# Patient Record
Sex: FEMALE | Race: WHITE | Employment: OTHER | URBAN - METROPOLITAN AREA
[De-identification: names, ages, dates, MRNs, and addresses within clinical notes are randomized per-mention and may not be internally consistent; named-entity substitution may affect disease eponyms.]

---

## 2017-10-13 ENCOUNTER — TELEPHONE (OUTPATIENT)
Dept: FAMILY MEDICINE CLINIC | Age: 82
End: 2017-10-13

## 2017-10-13 NOTE — TELEPHONE ENCOUNTER
THIS HAS TO GO THROUGH HER INSURANCE AND SHE IS  [place] I DO NOT HAVE ANY INFORMATION ON PT IS THIS SOMETHING THE NURSING HOME DOSE? Dunia Agarwal

## 2017-11-16 ENCOUNTER — TELEPHONE (OUTPATIENT)
Dept: FAMILY MEDICINE CLINIC | Age: 82
End: 2017-11-16

## 2018-08-07 ENCOUNTER — OFFICE VISIT (OUTPATIENT)
Dept: ENT CLINIC | Age: 83
End: 2018-08-07

## 2018-08-07 VITALS — HEART RATE: 55 BPM | DIASTOLIC BLOOD PRESSURE: 62 MMHG | SYSTOLIC BLOOD PRESSURE: 102 MMHG

## 2018-08-07 DIAGNOSIS — H90.0 CONDUCTIVE HEARING LOSS OF BOTH EARS: ICD-10-CM

## 2018-08-07 DIAGNOSIS — H61.23 BILATERAL IMPACTED CERUMEN: Primary | ICD-10-CM

## 2018-08-07 PROCEDURE — 69210 REMOVE IMPACTED EAR WAX UNI: CPT | Performed by: OTOLARYNGOLOGY

## 2018-08-07 PROCEDURE — 99999 PR OFFICE/OUTPT VISIT,PROCEDURE ONLY: CPT | Performed by: OTOLARYNGOLOGY

## 2018-08-07 RX ORDER — LISINOPRIL 10 MG/1
10 TABLET ORAL
COMMUNITY
Start: 2017-05-11

## 2018-08-07 RX ORDER — BUPROPION HYDROCHLORIDE 150 MG/1
150 TABLET ORAL EVERY MORNING
COMMUNITY

## 2018-08-07 RX ORDER — TRIAMTERENE AND HYDROCHLOROTHIAZIDE 37.5; 25 MG/1; MG/1
TABLET ORAL
COMMUNITY
Start: 2017-05-22

## 2018-08-22 ENCOUNTER — TELEPHONE (OUTPATIENT)
Dept: PHARMACY | Facility: CLINIC | Age: 83
End: 2018-08-22

## 2018-08-22 NOTE — TELEPHONE ENCOUNTER
CLINICAL PHARMACY: ADHERENCE REVIEW    Per [address]:  Atorvastatin and lisinopril last filled on 7/28/18 for a 28-day supply billed thru patient's [employer]. Patient is in a long term care facility and 28 days at a time are shipped out. No patient out reach planned at this time. -------------------------------------------------------------------------  For Pharmacy Admin Tracking Only  PHSO: Yes  Total # of Interventions Recommended: 1  - New Order #: 1 New Medication Order Reason(s):  Adherence  - Refills Provided #: 0  - Maintenance Safety Lab Monitoring #: 1  Total Interventions Accepted: 1  Time Spent (min): 5    Sanjuanita Keane, PharmD  [address]

## 2018-10-31 ENCOUNTER — TELEPHONE (OUTPATIENT)
Dept: FAMILY MEDICINE CLINIC | Age: 83
End: 2018-10-31

## 2020-01-01 ENCOUNTER — OFFICE VISIT (OUTPATIENT)
Dept: ORTHOPEDIC SURGERY | Age: 85
End: 2020-01-01
Payer: MEDICARE

## 2020-01-01 VITALS — TEMPERATURE: 97.9 F | BODY MASS INDEX: 26.5 KG/M2 | HEIGHT: 60 IN | WEIGHT: 135 LBS

## 2020-01-01 VITALS — TEMPERATURE: 98.6 F | BODY MASS INDEX: 26.5 KG/M2 | WEIGHT: 135 LBS | HEIGHT: 60 IN

## 2020-01-01 VITALS — TEMPERATURE: 97.1 F | BODY MASS INDEX: 26.37 KG/M2 | WEIGHT: 135 LBS

## 2020-01-01 PROCEDURE — 20610 DRAIN/INJ JOINT/BURSA W/O US: CPT | Performed by: ORTHOPAEDIC SURGERY

## 2020-01-01 PROCEDURE — G8417 CALC BMI ABV UP PARAM F/U: HCPCS | Performed by: ORTHOPAEDIC SURGERY

## 2020-01-01 PROCEDURE — 1123F ACP DISCUSS/DSCN MKR DOCD: CPT | Performed by: ORTHOPAEDIC SURGERY

## 2020-01-01 PROCEDURE — G8427 DOCREV CUR MEDS BY ELIG CLIN: HCPCS | Performed by: ORTHOPAEDIC SURGERY

## 2020-01-01 PROCEDURE — G8484 FLU IMMUNIZE NO ADMIN: HCPCS | Performed by: ORTHOPAEDIC SURGERY

## 2020-01-01 PROCEDURE — 4040F PNEUMOC VAC/ADMIN/RCVD: CPT | Performed by: ORTHOPAEDIC SURGERY

## 2020-01-01 PROCEDURE — 1090F PRES/ABSN URINE INCON ASSESS: CPT | Performed by: ORTHOPAEDIC SURGERY

## 2020-01-01 PROCEDURE — 99212 OFFICE O/P EST SF 10 MIN: CPT | Performed by: ORTHOPAEDIC SURGERY

## 2020-01-01 PROCEDURE — 1036F TOBACCO NON-USER: CPT | Performed by: ORTHOPAEDIC SURGERY

## 2020-07-26 ENCOUNTER — TELEPHONE (OUTPATIENT)
Dept: FAMILY MEDICINE CLINIC | Age: 85
End: 2020-07-26

## 2020-08-04 ENCOUNTER — HOSPITAL ENCOUNTER (EMERGENCY)
Age: 85
Discharge: HOME | End: 2020-08-04
Payer: MEDICARE

## 2020-08-04 VITALS
OXYGEN SATURATION: 98 % | HEART RATE: 73 BPM | RESPIRATION RATE: 18 BRPM | TEMPERATURE: 98 F | SYSTOLIC BLOOD PRESSURE: 151 MMHG | WEIGHT: 135 LBS | BODY MASS INDEX: 26.5 KG/M2 | HEIGHT: 60 IN | DIASTOLIC BLOOD PRESSURE: 85 MMHG

## 2020-08-04 PROCEDURE — 99283 EMERGENCY DEPT VISIT LOW MDM: CPT

## 2020-08-04 ASSESSMENT — ENCOUNTER SYMPTOMS
NAUSEA: 0
CHEST TIGHTNESS: 0
COUGH: 0
SHORTNESS OF BREATH: 0
VOMITING: 0
BACK PAIN: 0
ABDOMINAL PAIN: 0
DIARRHEA: 0
CONSTIPATION: 0
RESPIRATORY NEGATIVE: 1
PHOTOPHOBIA: 0
COLOR CHANGE: 0

## 2020-08-04 NOTE — ED NOTES
Report called to [facility], spoke with Maureen Reed RN, v/u. Denies further questions at this time.          Eddie Vora RN  08/04/20 [id number]

## 2020-08-04 NOTE — ED PROVIDER NOTES
[facility]        Pt Name: Aaron Olivas  MRN: [id number]  [place] 9/7/1930  Date of evaluation: 8/4/2020  Provider: COREY Cervantes  PCP: Vance Peralta MD    Evaluation by SORAYA. My supervising physician was available for consultation. CHIEF COMPLAINT       Chief Complaint   Patient presents with    Fall     pt brought in by EMS from assisted living. Pt c/o mechanical fall. Denies hitting head, denies LOC, uses 324 mg of ASA. Pt denies any pain at all. Pt sts \"i landed on my big fat ass and i know i didnt break that so i didnt want to come. \"        HISTORY OF PRESENT ILLNESS   (Location, Timing/Onset, Context/Setting, Quality, Duration, Modifying Factors, Severity, Associated Signs and Symptoms)  Note limiting factors. Aaron Olivas is a 80 y.o. female with past medical history of hypertension, hyperlipidemia, vertigo and diverticulosis who presents to the ED with complaint of a fall. Patient had a mechanical fall this morning. Patient lives in assisted living. Patient that she has chronic balance problems and states she lost her balance and fell onto her butt. Patient that she not hit her head. Denies loss of conscious. Does take full dose aspirin daily. Denies any other blood thinners. Patient states she feels fine did not want to come but her  push the \"panic button\" and EMS showed up and took her to the emergency department. Patient states she feels fine at this time. Denies any complaints. Denies any pain to her tailbone or buttock. Denies any hip pain, chest pain, shortness of breath, headache, lightheadedness/dizziness, neck pain, visual changes, speech disturbances, numbness/tingling, shortness of breath, abdominal pain, nausea/vomiting, urinary symptoms or changes in bowel movements. Patient states she does not want to be here at this time and wants to go back to her facility.   Denies any injury at this time. Nursing Notes were all reviewed and agreed with or any disagreements were addressed in the HPI. REVIEW OF SYSTEMS    (2-9 systems for level 4, 10 or more for level 5)     Review of Systems   Constitutional: Negative for activity change, appetite change, chills, diaphoresis, fatigue and fever. Eyes: Negative for photophobia and visual disturbance. Respiratory: Negative. Negative for cough, chest tightness and shortness of breath. Cardiovascular: Negative. Negative for chest pain, palpitations and leg swelling. Gastrointestinal: Negative for abdominal pain, constipation, diarrhea, nausea and vomiting. Genitourinary: Negative for decreased urine volume, difficulty urinating, dysuria, flank pain, frequency, hematuria and urgency. Musculoskeletal: Negative for arthralgias, back pain, gait problem, joint swelling, myalgias, neck pain and neck stiffness. Skin: Negative for color change, pallor, rash and wound. Neurological: Negative for dizziness, tremors, seizures, syncope, facial asymmetry, speech difficulty, weakness, light-headedness, numbness and headaches. Positives and Pertinent negatives as per HPI. Except as noted above in the ROS, all other systems were reviewed and negative. PAST MEDICAL HISTORY     Past Medical History:   Diagnosis Date    Diverticulosis of colon 03/29/2005    On colonoscopy    Hypercholesterolemia     Hypertension     Vertigo          SURGICAL HISTORY     Past Surgical History:   Procedure Laterality Date    COLONOSCOPY N/A 03/29/2005    Diverticulosis. No polyps         CURRENTMEDICATIONS       Previous Medications    AMLODIPINE (NORVASC) 5 MG TABLET    Take 2.5 mg by mouth daily. BUPROPION (WELLBUTRIN XL) 150 MG EXTENDED RELEASE TABLET    Take 150 mg by mouth every morning    LISINOPRIL (PRINIVIL;ZESTRIL) 10 MG TABLET    Take 10 mg by mouth    SIMVASTATIN (ZOCOR) 20 MG TABLET    Take 20 mg by mouth nightly. Chest:      Chest wall: No tenderness. Abdominal:      General: Abdomen is flat. Bowel sounds are normal. There is no distension. Palpations: Abdomen is soft. There is no mass. Tenderness: There is no abdominal tenderness. There is no right CVA tenderness, left CVA tenderness, guarding or rebound. Hernia: No hernia is present. Musculoskeletal: Normal range of motion. Comments: No tenderness to palpation of the midline cervical, thoracic or lumbar spine. No tenderness over the sacrum or coccyx. No pelvis instability. No anterior chest wall tenderness. No TTP to the upper and lower extremities throughout. Full range of motion strength the upper and lower extremity throughout. Distal neurovascular intact. Gait deferred at this time. Lymphadenopathy:      Cervical: No cervical adenopathy. Skin:     General: Skin is warm and dry. Coloration: Skin is not pale. Findings: No erythema or rash. Neurological:      General: No focal deficit present. Mental Status: She is alert and oriented to person, place, and time. GCS: GCS eye subscore is 4. GCS verbal subscore is 5. GCS motor subscore is 6. Cranial Nerves: Cranial nerves are intact. No cranial nerve deficit. Sensory: No sensory deficit. Motor: Motor function is intact. Comments: Gait deferred at this time. Psychiatric:         Behavior: Behavior normal.         DIAGNOSTIC RESULTS   LABS:    Labs Reviewed - No data to display    All other labs were within normal range or not returned as of this dictation. EKG: All EKG's are interpreted by the Emergency Department Physician in the absence of a cardiologist.  Please see their note for interpretation of EKG.       RADIOLOGY:   Non-plain film images such as CT, Ultrasound and MRI are read by the radiologist. Plain radiographic images are visualized and preliminarily interpreted by the ED Provider with the below findings:        Interpretation per the Radiologist below, if available at the time of this note:    No orders to display     No results found. PROCEDURES   Unless otherwise noted below, none     Procedures    CRITICAL CARE TIME   N/A    CONSULTS:  None      EMERGENCY DEPARTMENT COURSE and DIFFERENTIAL DIAGNOSIS/MDM:   Vitals:    Vitals:    08/04/20 1339 08/04/20 1351   BP:  (!) 151/85   Pulse: 73    Resp: 18    Temp: 98 °F (36.7 °C)    TempSrc: Oral    SpO2: 98%    Weight: 135 lb (61.2 kg)    Height: 5' (1.524 m)        Patient was given the following medications:  Medications - No data to display        Patient is an 77-year-old female who presents to the ED plan of a fall. Patient that she has chronic balance problems. Patient states she had a fall and she landed on her tailbone. Denies any pain to the tailbone. Patient denies any other injury or trauma throughout. Denies any head injury or loss of conscious. Denies any neck pain. No injury noted on exam.  Full range of motion strength throughout. Distal neurovascular intact. Able to ambulate here in the ED without difficulty. Alert and oriented x3 with reassuring neuro examination and no injury noted on exam.  Given patient's history and physical examination believe can be safely discharged home for outpatient management. Return ED for any worsening symptoms. Low suspicion for intracranial injury, cervical injury, acute fracture/dislocation, intra-abdominal abnormality, intrathoracic abnormality, infection or other emergent condition at this time. FINAL IMPRESSION      1. Fall from slip, trip, or stumble, initial encounter          DISPOSITION/PLAN   DISPOSITION Decision To Discharge 08/04/2020 02:26:17 PM      PATIENT REFERREDTO:  Yanna Yun [address]  [phone]    Schedule an appointment as soon as possible for a visit   For a Re-check in  3-5    days.     [facility]  [place]

## 2020-08-04 NOTE — ED NOTES
Pt d/c instructions given, v/u.Denies needs at this time. A&O with no signs of distress. Pt wheeled to exit.      Gamaliel Werner RN  08/04/20 9623

## 2020-08-17 ENCOUNTER — OFFICE VISIT (OUTPATIENT)
Dept: ORTHOPEDIC SURGERY | Age: 85
End: 2020-08-17
Payer: MEDICARE

## 2020-08-17 VITALS — HEIGHT: 60 IN | TEMPERATURE: 97.9 F | BODY MASS INDEX: 26.5 KG/M2 | WEIGHT: 135 LBS

## 2020-08-17 PROCEDURE — 20610 DRAIN/INJ JOINT/BURSA W/O US: CPT | Performed by: ORTHOPAEDIC SURGERY

## 2020-08-17 PROCEDURE — 4040F PNEUMOC VAC/ADMIN/RCVD: CPT | Performed by: ORTHOPAEDIC SURGERY

## 2020-08-17 PROCEDURE — 1123F ACP DISCUSS/DSCN MKR DOCD: CPT | Performed by: ORTHOPAEDIC SURGERY

## 2020-08-17 PROCEDURE — 99203 OFFICE O/P NEW LOW 30 MIN: CPT | Performed by: ORTHOPAEDIC SURGERY

## 2020-08-17 PROCEDURE — 1036F TOBACCO NON-USER: CPT | Performed by: ORTHOPAEDIC SURGERY

## 2020-08-17 PROCEDURE — G8427 DOCREV CUR MEDS BY ELIG CLIN: HCPCS | Performed by: ORTHOPAEDIC SURGERY

## 2020-08-17 PROCEDURE — 1090F PRES/ABSN URINE INCON ASSESS: CPT | Performed by: ORTHOPAEDIC SURGERY

## 2020-08-17 PROCEDURE — G8419 CALC BMI OUT NRM PARAM NOF/U: HCPCS | Performed by: ORTHOPAEDIC SURGERY

## 2020-08-17 RX ORDER — METHYLPREDNISOLONE ACETATE 40 MG/ML
40 INJECTION, SUSPENSION INTRA-ARTICULAR; INTRALESIONAL; INTRAMUSCULAR; SOFT TISSUE ONCE
Status: COMPLETED | OUTPATIENT
Start: 2020-08-17 | End: 2020-08-17

## 2020-08-17 RX ORDER — ROPIVACAINE HYDROCHLORIDE 5 MG/ML
30 INJECTION, SOLUTION EPIDURAL; INFILTRATION; PERINEURAL ONCE
Status: COMPLETED | OUTPATIENT
Start: 2020-08-17 | End: 2020-08-17

## 2020-08-17 RX ADMIN — ROPIVACAINE HYDROCHLORIDE 30 ML: 5 INJECTION, SOLUTION EPIDURAL; INFILTRATION; PERINEURAL at 11:36

## 2020-08-17 RX ADMIN — METHYLPREDNISOLONE ACETATE 40 MG: 40 INJECTION, SUSPENSION INTRA-ARTICULAR; INTRALESIONAL; INTRAMUSCULAR; SOFT TISSUE at 11:36

## 2020-08-17 NOTE — PROGRESS NOTES
[facility]  Office Visit  New Patient  Date:  2020    Name:  Larisa Conde  Address:  [address] [place], [place] [address]    :  1930      Age:   80 y.o.    SSN:  [id number]      Medical Record Number:  [id number]    Chief Complaint:    Right knee pain    HPI:   Larisa Conde is a 80 y.o. female who presents for evaluation of right knee pain. Patient states she had an injury when she was much younger falling on ice onto the right knee. Since that time she is had on and off again right knee pain. She states recently is gotten much worse. She states most of her pain is in the front of the knee. She is taking over-the-counter ibuprofen for pain relief. She denies ever having previous injections into the knee. She states she is in physical therapy currently. She has not been ambulating for the last 4 weeks and prior to this was using a walker for ambulation. She denies any new numbness, tingling, fevers, chills, chest pain, shortness of breath, or any other new significant symptoms. Pain Assessment  Location of Pain: Knee  Location Modifiers: Left, Right  Severity of Pain: 8  Quality of Pain: Aching  Duration of Pain: Persistent  Frequency of Pain: Constant  Date Pain First Started: (ongoing for years)  Aggravating Factors: Bending, Stretching, Straightening, Walking, Stairs  Limiting Behavior: Yes  Relieving Factors: Rest, Ice, Exercise  Result of Injury: No  Work-Related Injury: No  Are there other pain locations you wish to document?: No    Past History:  Past Medical History:   Diagnosis Date    Diverticulosis of colon 2005    On colonoscopy    Hypercholesterolemia     Hypertension     Vertigo        Past Surgical History:   Procedure Laterality Date    COLONOSCOPY N/A 2005    Diverticulosis.   No polyps       Social History     Tobacco Use    Smoking status: Never Smoker    Smokeless tobacco: Never Used   Substance Use Topics    Alcohol use: No    Drug use: Not on file        Family History:  family history is not on file. Current Outpatient Medications:     lisinopril (PRINIVIL;ZESTRIL) 10 MG tablet, Take 10 mg by mouth, Disp: , Rfl:     triamterene-hydrochlorothiazide (MAXZIDE-25) 37.5-25 MG per tablet, TAKE ONE TABLET BY MOUTH DAILY, Disp: , Rfl:     buPROPion (WELLBUTRIN XL) 150 MG extended release tablet, Take 150 mg by mouth every morning, Disp: , Rfl:     amlodipine (NORVASC) 5 MG tablet, Take 2.5 mg by mouth daily. , Disp: , Rfl:     simvastatin (ZOCOR) 20 MG tablet, Take 20 mg by mouth nightly.  , Disp: , Rfl:       Allergies   Allergen Reactions    Pneumococcal Vaccine      Pneumococcal 23-eric Ps Vaccine  Redness, swelling of arm- 10/2014      Escitalopram Nausea Only     Loss of appetite 10/14    Amoxicillin Rash    Tramadol Nausea And Vomiting         Review of Systems: A 14 point review of systems available in the scanned medical record as documented by the patient on 8/17/2020. Today's review pertinent items are noted in HPI. No notes on file    Physical Exam:  Temp 97.9 °F (36.6 °C)   Ht 5' (1.524 m)   Wt 135 lb (61.2 kg)   BMI 26.37 kg/m²       General: No acute distress, well nourished  CV: No obvious peripheral edema.  Normal peripheral pulses  Neuro: Alert & oriented x 3  Psych: Normal mood and affect    Right knee exam    Minimal effusion present  No redness or warmth  Patient is roughly 5 degrees short of full extension, can flex to approximately 115 degrees  Knee stable with varus valgus testing  Grossly patient does have a valgus alignment this is passively correctable to near neutral  Patient has tenderness to palpation anteriorly along the medial lateral joint lines as well as the edge of the patella      Radiographic:  X-rays obtained and reviewed in office, reviewed and interpreted by me today:  Views: 3 views  Location: Right knee  Impression: Patient has tricompartmental osteoarthritis most prominently in the lateral compartment    Assessment:  Harper Maurer is a 80 y.o. female with:  1. Right knee osteoarthritis    Impression:  Encounter Diagnosis   Name Primary?  Left knee pain, unspecified chronicity Yes       Office Procedures:  No orders of the defined types were placed in this encounter. Plan:   Pertinent imaging was reviewed. The etiology, natural history, and treatment options for the disorder were discussed. The roles of activity modifications, medications, injections, physical therapy, and surgical interventions were all described to the patient and questions were answered. Discussed with the patient that she does have osteoarthritis of the right knee. She has not tried corticosteroid injections and we did recommend this today. Patient was agreeable to this, see procedure note. We encouraged the patient to continue to work with physical therapy and to stay active. We would like to see her back in approximately 1 month for repeat clinical exam.    Procedure note-right knee intra-articular steroid injection    The right knee was sterilely prepped with alcohol. Next solution containing 80 mg of Depo-Medrol as well as 15 mg of ropivacaine was injected into the right knee for osteoarthritis. Band-Aid was applied patient tolerated the procedure well. [address]    The encounter with Harper Maurer was supervised by Dr. Young Peralta who personally examined the patient and reviewed the plan. This dictation was performed with a verbal recognition program (DRAGON) and it was checked for errors. It is possible that there are still dictated errors within this office note. If so, please bring any errors to my attention for an addendum. All efforts were made to ensure that this office note is accurate.  Attestation:  I was physically present and performed my own examination of this patient and have discussed the case, including pertinent history and exam findings with the fellow. I agree with the documented assessment and plan. King Cem.  Leeann Cuevas MD

## 2020-08-26 ENCOUNTER — TELEPHONE (OUTPATIENT)
Dept: ORTHOPEDIC SURGERY | Age: 85
End: 2020-08-26

## 2020-08-26 NOTE — TELEPHONE ENCOUNTER
Called and spoke with nurse [place] -- advised icing and they are giving her OTC ibuprofen --  If not better in a few weeks we can see about getting her scheduled for supartz inj  -- order placed to start precert on supartz we see once approved or denied

## 2020-08-26 NOTE — TELEPHONE ENCOUNTER
Patient's nursing home called. Patient had Cortizone shot in knee at last appointment. She is still in pain and wanting to know if there is anything else she can do.  Please contact patient's nurse, Bryanna Muhammad, at [phone]

## 2020-08-31 ENCOUNTER — TELEPHONE (OUTPATIENT)
Dept: ORTHOPEDIC SURGERY | Age: 85
End: 2020-08-31

## 2020-09-08 ENCOUNTER — TELEPHONE (OUTPATIENT)
Dept: ORTHOPEDIC SURGERY | Age: 85
End: 2020-09-08

## 2020-09-08 NOTE — TELEPHONE ENCOUNTER
09/08/2020 GELSYN-3 (SERIES OF 3)  RIGHT  KNEE. NO AUTHORIZATION REQUIRED. VALID & BILLABLE. CAN BUY& BILL. PER NOTES FOR THIS [facility] MEDICARE [facility]. NOTE: Original request was for non-preferred drug SUPARTZ. Okay to switch to preferred drug GELSYN-E, Per Joel .  VLADIMIR    AP

## 2020-09-10 ENCOUNTER — TELEPHONE (OUTPATIENT)
Dept: ORTHOPEDIC SURGERY | Age: 85
End: 2020-09-10

## 2020-09-10 NOTE — TELEPHONE ENCOUNTER
Patient's  is calling was trying to schedule wife for injection. Patient is already scheduled. He would like a call back.  Ph [phone]

## 2020-09-21 ENCOUNTER — OFFICE VISIT (OUTPATIENT)
Dept: ORTHOPEDIC SURGERY | Age: 85
End: 2020-09-21
Payer: MEDICARE

## 2020-09-21 VITALS — HEIGHT: 60 IN | BODY MASS INDEX: 26.5 KG/M2 | WEIGHT: 135 LBS | TEMPERATURE: 98.6 F

## 2020-09-21 PROCEDURE — 4040F PNEUMOC VAC/ADMIN/RCVD: CPT | Performed by: ORTHOPAEDIC SURGERY

## 2020-09-21 PROCEDURE — 1090F PRES/ABSN URINE INCON ASSESS: CPT | Performed by: ORTHOPAEDIC SURGERY

## 2020-09-21 PROCEDURE — 1123F ACP DISCUSS/DSCN MKR DOCD: CPT | Performed by: ORTHOPAEDIC SURGERY

## 2020-09-21 PROCEDURE — G8427 DOCREV CUR MEDS BY ELIG CLIN: HCPCS | Performed by: ORTHOPAEDIC SURGERY

## 2020-09-21 PROCEDURE — G8417 CALC BMI ABV UP PARAM F/U: HCPCS | Performed by: ORTHOPAEDIC SURGERY

## 2020-09-21 PROCEDURE — 20610 DRAIN/INJ JOINT/BURSA W/O US: CPT | Performed by: ORTHOPAEDIC SURGERY

## 2020-09-21 PROCEDURE — 1036F TOBACCO NON-USER: CPT | Performed by: ORTHOPAEDIC SURGERY

## 2020-09-21 PROCEDURE — 99213 OFFICE O/P EST LOW 20 MIN: CPT | Performed by: ORTHOPAEDIC SURGERY

## 2020-09-21 NOTE — PROGRESS NOTES
37.5-25 MG per tablet, TAKE ONE TABLET BY MOUTH DAILY, Disp: , Rfl:   buPROPion (WELLBUTRIN XL) 150 MG extended release tablet, Take 150 mg by mouth every morning, Disp: , Rfl:   amlodipine (NORVASC) 5 MG tablet, Take 2.5 mg by mouth daily. , Disp: , Rfl:   simvastatin (ZOCOR) 20 MG tablet, Take 20 mg by mouth nightly.  , Disp: , Rfl:     No current facility-administered medications for this visit. -- Pneumococcal Vaccine     --  Pneumococcal 23-eric Ps Vaccine             Redness, swelling of arm- 10/2014   -- Escitalopram -- Nausea Only    --  Loss of appetite 10/14   -- Amoxicillin -- Rash   -- Tramadol -- Nausea And Vomiting    VITAL SIGNS:  Temp 98.6 °F (37 °C)   Ht 5' (1.524 m)   Wt 135 lb (61.2 kg)   BMI 26.37 kg/m²   On examination today she lacks about 15 degrees of extension and flexes to about 90 degrees. She has no warmth, erythema, or effusion. After sterile prep we injected the right knee with 2 mL's of gelsyn 3 for osteoarthritis. The patient tolerated this procedure well.

## 2020-09-28 NOTE — PROGRESS NOTES
The patient returns today for right knee arthritis. She is scheduled for second Visco supplementation. She had no trouble after the first shot. ROS: Pertinent items are noted in HPI. No notes on file    Past Medical History:  8/7/2018: Conductive hearing loss of both ears  No date: Dementia ([address]  No date: Depression  03/29/2005: Diverticulosis of colon      Comment:  On colonoscopy  No date: Essential tremor  No date: Hypercholesterolemia  No date: Hypertension  No date: Paroxysmal atrial fibrillation (HCC)  No date: Senile osteoporosis  No date: Vertigo     Past Surgical History:  03/29/2005: COLONOSCOPY; N/A      Comment:  Diverticulosis. No polyps    History reviewed. No pertinent family history.       Social History    Socioeconomic History      Marital status:       Spouse name: None      Number of children: None      Years of education: None      Highest education level: None    Occupational History      None    Social Needs      Financial resource strain: None      Food insecurity        Worry: None        Inability: None      Transportation needs        Medical: None        Non-medical: None    Tobacco Use      Smoking status: Never Smoker      Smokeless tobacco: Never Used    Substance and Sexual Activity      Alcohol use: No      Drug use: None      Sexual activity: None    Lifestyle      Physical activity        Days per week: None        Minutes per session: None      Stress: None    Relationships      Social connections        Talks on phone: None        Gets together: None        Attends Anglican service: None        Active member of club or organization: None        Attends meetings of clubs or organizations: None        Relationship status: None      Intimate partner violence        Fear of current or ex partner: None        Emotionally abused: None        Physically abused: None        Forced sexual activity: None    Other Topics      Concerns:        None    Social History Narrative      None      Current Outpatient Medications:  lisinopril (PRINIVIL;ZESTRIL) 10 MG tablet, Take 10 mg by mouth, Disp: , Rfl:   triamterene-hydrochlorothiazide (MAXZIDE-25) 37.5-25 MG per tablet, TAKE ONE TABLET BY MOUTH DAILY, Disp: , Rfl:   buPROPion (WELLBUTRIN XL) 150 MG extended release tablet, Take 150 mg by mouth every morning, Disp: , Rfl:   amlodipine (NORVASC) 5 MG tablet, Take 2.5 mg by mouth daily. , Disp: , Rfl:   simvastatin (ZOCOR) 20 MG tablet, Take 20 mg by mouth nightly.  , Disp: , Rfl:     No current facility-administered medications for this visit. -- Pneumococcal Vaccine     --  Pneumococcal 23-eric Ps Vaccine             Redness, swelling of arm- 10/2014   -- Escitalopram -- Nausea Only    --  Loss of appetite 10/14   -- Amoxicillin -- Rash   -- Tramadol -- Nausea And Vomiting    VITAL SIGNS:  Temp 97.9 °F (36.6 °C)   Ht 5' (1.524 m)   Wt 135 lb (61.2 kg)   BMI 26.37 kg/m²   Lamination right knee shows no warmth, erythema, or effusion. After sterile prep injected right knee with 2 mL's of Gelsyn 3 for osteoarthritis. The patient tolerated this procedure well.

## 2020-10-19 NOTE — PROGRESS NOTES
Patient returns with her  today. She is scheduled for third Jellison injection right knee. She states she thinks she feels a little better. ROS: Pertinent items are noted in HPI. No notes on file    Past Medical History:  8/7/2018: Conductive hearing loss of both ears  No date: Dementia ([address]  No date: Depression  03/29/2005: Diverticulosis of colon      Comment:  On colonoscopy  No date: Essential tremor  No date: Hypercholesterolemia  No date: Hypertension  No date: Paroxysmal atrial fibrillation (HCC)  No date: Senile osteoporosis  No date: Vertigo     Past Surgical History:  03/29/2005: COLONOSCOPY; N/A      Comment:  Diverticulosis. No polyps    History reviewed. No pertinent family history.       Social History    Socioeconomic History      Marital status:       Spouse name: None      Number of children: None      Years of education: None      Highest education level: None    Occupational History      None    Social Needs      Financial resource strain: None      Food insecurity        Worry: None        Inability: None      Transportation needs        Medical: None        Non-medical: None    Tobacco Use      Smoking status: Never Smoker      Smokeless tobacco: Never Used    Substance and Sexual Activity      Alcohol use: No      Drug use: None      Sexual activity: None    Lifestyle      Physical activity        Days per week: None        Minutes per session: None      Stress: None    Relationships      Social connections        Talks on phone: None        Gets together: None        Attends Baptism service: None        Active member of club or organization: None        Attends meetings of clubs or organizations: None        Relationship status: None      Intimate partner violence        Fear of current or ex partner: None        Emotionally abused: None        Physically abused: None        Forced sexual activity: None    Other Topics      Concerns:        None    Social History

## 2020-11-06 NOTE — TELEPHONE ENCOUNTER
Patient has a new rhythmic hand tremors. Is not able to button her close and hold things. A/P tremor  Tremor could be an interaction of the amantadine and dextromethorphan (Siltussin, DM). Or it could be a dopaminergic side effect of bupropion at 300 mg as patient was on the 50 mg not sure when prior dose was. Could be a B12 or folate deficiency. Ordered serum homocystine and urine MMA. Could be a thyroid problem get a TSH with reflex free T4. If all normal would refer patient to Dr. Tessa Werner neurology at [facility].
ALCON Segura

## 2020-11-16 NOTE — PROGRESS NOTES
Patient is here for follow-up of right knee osteoarthritis. She completed viscosupplementation over a month ago does not feel she got much relief. Prior to this she had a steroid injections which did not feel provided much relief. ROS: Pertinent items are noted in HPI. No notes on file    Past Medical History:  8/7/2018: Conductive hearing loss of both ears  No date: Dementia ([address]  No date: Depression  03/29/2005: Diverticulosis of colon      Comment:  On colonoscopy  No date: Essential tremor  No date: Hypercholesterolemia  No date: Hypertension  No date: Paroxysmal atrial fibrillation (HCC)  No date: Senile osteoporosis  No date: Vertigo     Past Surgical History:  03/29/2005: COLONOSCOPY; N/A      Comment:  Diverticulosis. No polyps    History reviewed. No pertinent family history.       Social History    Socioeconomic History      Marital status:       Spouse name: None      Number of children: None      Years of education: None      Highest education level: None    Occupational History      None    Social Needs      Financial resource strain: None      Food insecurity        Worry: None        Inability: None      Transportation needs        Medical: None        Non-medical: None    Tobacco Use      Smoking status: Never Smoker      Smokeless tobacco: Never Used    Substance and Sexual Activity      Alcohol use: No      Drug use: None      Sexual activity: None    Lifestyle      Physical activity        Days per week: None        Minutes per session: None      Stress: None    Relationships      Social connections        Talks on phone: None        Gets together: None        Attends [employer] service: None        Active member of club or organization: None        Attends meetings of clubs or organizations: None        Relationship status: None      Intimate partner violence        Fear of current or ex partner: None        Emotionally abused: None        Physically abused: None        Forced sexual activity: None    Other Topics      Concerns:        None    Social History Narrative      None      Current Outpatient Medications:  lisinopril (PRINIVIL;ZESTRIL) 10 MG tablet, Take 10 mg by mouth, Disp: , Rfl:   triamterene-hydrochlorothiazide (MAXZIDE-25) 37.5-25 MG per tablet, TAKE ONE TABLET BY MOUTH DAILY, Disp: , Rfl:   buPROPion (WELLBUTRIN XL) 150 MG extended release tablet, Take 150 mg by mouth every morning, Disp: , Rfl:   amlodipine (NORVASC) 5 MG tablet, Take 2.5 mg by mouth daily. , Disp: , Rfl:   simvastatin (ZOCOR) 20 MG tablet, Take 20 mg by mouth nightly.  , Disp: , Rfl:     No current facility-administered medications for this visit. -- Pneumococcal Vaccine     --  Pneumococcal 23-eric Ps Vaccine             Redness, swelling of arm- 10/2014   -- Escitalopram -- Nausea Only    --  Loss of appetite 10/14   -- Amoxicillin -- Rash   -- Tramadol -- Nausea And Vomiting    VITAL SIGNS:  Temp 97.1 °F (36.2 °C)   Wt 135 lb (61.2 kg)   BMI 26.37 kg/m²   On examination with her  present she actually states that she did get some relief. She is having no pain sitting in a wheelchair today. She can push off with her foot to move around in her wheelchair. There is no warmth erythema or effusion. She has quite good range of motion still maybe lacks 5 degrees of extension but still has 100 degrees of flexion. Impression: Doing well status post viscosupplementation right knee. We discussed possibility of total knee arthroplasty as she has really failure of the lateral compartment but both responded that she was not interested in total knee arthroplasty. We discussed that in 6-month intervals as this would probably be repaid for by insurance again so they will come back at this time if she is again more symptomatic.

## 2021-07-28 NOTE — ED NOTES
Called and  requested test results: Garrett   Test results being requested: mri  When was the test done: 7/26/2021  Call back number: [phone]  Okay to leave detailed voice message: yes       Road test done with pt. Tolerated well. Provider notified at this time. Pt denies needs at this time. A&O with no signs of distress. Pt sitting supine in bed in low position, call light in reach, side rails up x2, and monitors in place. Will continue to monitor.           Jose Guadalupe Rainey RN  08/04/20 [id number]